# Patient Record
(demographics unavailable — no encounter records)

---

## 2018-07-29 NOTE — EMERGENCY DEPARTMENT REPORT
ED Pregnancy HPI





- General


Chief complaint: Vaginal Bleeding


Stated complaint: VAGINAL BLEEDING


Time Seen by Provider: 18 19:26


Source: patient


Mode of arrival: Ambulatory


Limitations: No Limitations





- History of Present Illness


Initial comments: 





37-year-old -American female comes in stating she is having vaginal 

bleeding since 2018 with increased bleeding and severe pelvic cramps.  

Patient reports that she is using 4 pads today and is now passing large blood 

clots.  Patient's cycle prior to the  was 2018 and 2018.  

Patient believes that she is having a miscarriage.  It patient has not had a 

pregnancy test at home.  Patient is  4 para 4.  Currently on no birth 

control.  She reports that the cramps are worse at night.  She does report she 

was taking Midol which has not helped much.  She has a past medical history 

bipolar anxiety.  She surgical history 4 C-sections and appendectomy.  Patient 

denies any nausea vomiting no fever no chills.  Denies any vaginal discharge.


MD Complaint: vaginal bleeding


-: days(s) (3)


Location: pelvis


Radiation: back


Severity scale (0 -10): 10


Quality: cramping


Consistency: intermittent


Improves with: none


Worsens with: none


Associated symptoms: vaginal bleeding, abdominal pain


Vaginal bleeding: heavy, clots


Pregnant:: No


Last menstrual period: 18


Pre- care: none





- Related Data


: 4


Para: 4


 Previous Rx's











 Medication  Instructions  Recorded  Last Taken  Type


 


Ibuprofen [Motrin 800 MG tab] 800 mg PO Q8HR #30 tablet 18 Unknown Rx











 Allergies











Allergy/AdvReac Type Severity Reaction Status Date / Time


 


No Known Allergies Allergy   Unverified 18 16:42














ED Review of Systems


ROS: 


Stated complaint: VAGINAL BLEEDING


Other details as noted in HPI





Gastrointestinal: abdominal pain.  denies: nausea, vomiting


Genitourinary: other


Musculoskeletal: back pain


Neurological: denies: headache, weakness, paresthesias


Psychiatric: anxiety


Hematological/Lymphatic: denies: easy bleeding, easy bruising





ED Past Medical Hx





- Past Medical History


Previous Medical History?: No





- Surgical History


Hx Appendectomy: Yes


Additional Surgical History: 





- Social History


Smoking Status: Never Smoker


Substance Use Type: None





- Medications


Home Medications: 


 Home Medications











 Medication  Instructions  Recorded  Confirmed  Last Taken  Type


 


Ibuprofen [Motrin 800 MG tab] 800 mg PO Q8HR #30 tablet 18  Unknown Rx














ED Physical Exam





- General


Limitations: No Limitations


General appearance: alert, in no apparent distress





- Head


Head exam: Present: atraumatic, normocephalic





- Eye


Eye exam: Present: normal appearance





- ENT


ENT exam: Present: mucous membranes moist





- Neck


Neck exam: Present: normal inspection





- Respiratory


Respiratory exam: Present: normal lung sounds bilaterally.  Absent: respiratory 

distress





- Cardiovascular


Cardiovascular Exam: Present: regular rate, normal rhythm.  Absent: systolic 

murmur, diastolic murmur, rubs, gallop





- GI/Abdominal


GI/Abdominal exam: Present: soft, tenderness (pelvic).  Absent: distended





- Extremities Exam


Extremities exam: Present: normal inspection, full ROM





- Back Exam


Back exam: Present: full ROM.  Absent: tenderness





- Neurological Exam


Neurological exam: Present: alert, oriented X3





- Psychiatric


Psychiatric exam: Present: normal affect, normal mood





- Skin


Skin exam: Present: warm, dry, intact, normal color.  Absent: rash





ED Course





 Vital Signs











  18





  16:36


 


Temperature 98.5 F


 


Pulse Rate 93 H


 


Respiratory 16





Rate 


 


Blood Pressure 132/97


 


O2 Sat by Pulse 98





Oximetry 














ED Medical Decision Making





- Lab Data


Result diagrams: 


 18 16:46








- Medical Decision Making





Patient has been evaluated by this provider fast track.


CBC is within normal limits no signs of anemia.


Serum hCG is less than 2 no signs of pregnancy.


Urinalysis only shows large amount of blood no signs of infection.


Discussed the patient that she can take ibuprofen for cramps.  To follow-up 

with her primary OB/GYN or primary care provider.


Discussed patient she can use warm compresses to her pelvic cramps and heavy 

periods.


Critical care attestation.: 


If time is entered above; I have spent that time in minutes in the direct care 

of this critically ill patient, excluding procedure time.








ED Disposition


Clinical Impression: 


Menorrhagia


Qualifiers:


 Menorrahagia type: with onset of menstrual periods Qualified Code(s): N92.2 - 

Excessive menstruation at puberty





Disposition: DC-01 TO HOME OR SELFCARE


Is pt being admited?: No


Does the pt Need Aspirin: No


Condition: Stable


Instructions:  Menorrhagia (ED)


Additional Instructions: 


Please take ibuprofen for pain management.  Recommended to follow up with her 

primary care provider or OB/GYN.


Prescriptions: 


Ibuprofen [Motrin 800 MG tab] 800 mg PO Q8HR #30 tablet


Referrals: 


PRIMARY CARE,MD [Primary Care Provider] - 3-5 Days


Forms:  Accompanied Note